# Patient Record
(demographics unavailable — no encounter records)

---

## 2025-01-08 NOTE — PHYSICAL EXAM
[FreeTextEntry1] : Alert, comfortable, in no apparent distress, 2 year old girl. She has no obvious clinical orthopedic deformities in her lower extremities.  No leg length discrepancies.  Hip abduction with the hips in flexion approximately 70 degrees bilaterally.  No clinical signs of hip instability.  Good alignment of both ankles and feet.

## 2025-01-08 NOTE — HISTORY OF PRESENT ILLNESS
[0] : currently ~his/her~ pain is 0 out of 10 [FreeTextEntry1] : Callie is a 3 yo F who returns for f/u visit.  She was born breech and was being followed for DDH. No joselin used in the past.   Overall, she has been doing well.  She has been walking independently since age 15 months old. She is currently walking, running.  No need for any early intervention services, no PT/OT needed.  No signs of discomfort/pain, no limping, no episodes of refusal to ambulate, no recent injuries, no recent fevers. She is here today for xrays of the pelvis to check hip development.

## 2025-01-08 NOTE — DATA REVIEWED
[de-identified] : X-rays of her hips taken today including AP and frog-lateral views show both hips well located, both ossified nuclei are present, and appear symmetric.  Acetabular index on the right is approximately 18 degrees, on the left is around 21 degrees.

## 2025-01-08 NOTE — ASSESSMENT
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  The history was obtained today from the child and parent; given the patient's age and/or the child's mental capacity, the history was unreliable and the parent was used as an independent historian.  Callie is a 2 year old girl who was born breech. X-rays of her hips taken today including AP and frog-lateral views show both hips well located, both ossified nuclei are present, and appear symmetric.  Acetabular index on the right is approximately 18 degrees, on the left is around 21 degrees. Shentons lines intact.  No concerns today on xrays or PE today. Continued observation is needed to ensure the hips are developing well.  She will f/u in 2 years and we will repeat AP xrays of the pelvis.  Parents were informed that this may be the last time that she needs to be seen if the x-rays continue to show hip normalization. All questions answered. Parent in agreement with the plan.  I, Brooke Ray PA-C, have acted as a scribe and documented the above for Dr. Crooks.   The above documentation completed by the PA is an accurate record of both my words and actions. Marck Crooks MD.